# Patient Record
Sex: FEMALE
[De-identification: names, ages, dates, MRNs, and addresses within clinical notes are randomized per-mention and may not be internally consistent; named-entity substitution may affect disease eponyms.]

---

## 2019-04-04 ENCOUNTER — HOSPITAL ENCOUNTER (OUTPATIENT)
Dept: HOSPITAL 92 - SCSMRI | Age: 54
Discharge: HOME | End: 2019-04-04
Attending: PODIATRIST
Payer: COMMERCIAL

## 2019-04-04 DIAGNOSIS — M77.31: Primary | ICD-10-CM

## 2019-04-04 DIAGNOSIS — Z98.890: ICD-10-CM

## 2019-04-04 DIAGNOSIS — R60.0: ICD-10-CM

## 2019-04-04 PROCEDURE — A9577 INJ MULTIHANCE: HCPCS

## 2019-04-04 NOTE — MRI
FEXAM:MRI of right ankle performed with and without contrast enhancement



HISTORY: Previous surgery for heel spur removal in August 2018 persistent pain since then.



COMPARISON: Plain film examination done 9/10/2011 of the left heel.



FINDINGS:Postoperative changes of the heel are noted. This includes resection of a Erasmo's type def
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
endinosis. Edema changes are seen in the region of the retrocalcaneal bursa. There are also edema lon
nges in the subarticular marrow along the superior aspect of the heel in this region. These edema lon
nges extend along the lateral side of the calcaneus with associated enhancement. There is also edema 
and enhancement of the adjacent soft tissues.



In addition there are marrow edema changes involving the middle cuneiform mainly the dorsal half. The
re is associated enhancement.



The peroneus longus and brevis tendons are intact. The posterior tibialis, flexor digitorum longus an
d flexor hallucis longus tendons are intact. Anterior extensor tendon group is normal.



Sinus tarsi region and plantar fascia are unremarkable.



IMPRESSION:

1. Postoperative changes of the heel. These include what appear to be a postoperative resection of a 
Erasmo's type deformity and some ossification related to the distal Achilles tendon which shows stephanie
ed tendinosis. Surgical anchors stabilize the Achilles tendon in the distal calcaneus. There are neri
a changes and enhancement which are mainly superior to the more proximal screw and along the lateral 
side of the calcaneus and there are edema changes and enhancement in the adjacent soft tissue without
 signs of abscess. These changes are greater than would be expected this late postoperative and an in
fectious process would have to be considered.

2. The edema changes within the middle cuneiform could be on the basis of altered walking dynamics an
d stress type reaction, hematogenous spread of infection is not excluded.



Reported By: Eliseo Macias 

Electronically Signed:  4/4/2019 2:30 PM